# Patient Record
Sex: FEMALE | NOT HISPANIC OR LATINO | URBAN - METROPOLITAN AREA
[De-identification: names, ages, dates, MRNs, and addresses within clinical notes are randomized per-mention and may not be internally consistent; named-entity substitution may affect disease eponyms.]

---

## 2024-11-20 ENCOUNTER — HOSPITAL ENCOUNTER (OUTPATIENT)
Dept: RADIOLOGY | Facility: CLINIC | Age: 29
Discharge: HOME | End: 2024-11-20
Payer: COMMERCIAL

## 2024-11-20 DIAGNOSIS — Z34.90 ENCOUNTER FOR SUPERVISION OF NORMAL PREGNANCY, UNSPECIFIED, UNSPECIFIED TRIMESTER: ICD-10-CM

## 2024-11-20 PROCEDURE — 76819 FETAL BIOPHYS PROFIL W/O NST: CPT

## 2024-11-20 PROCEDURE — 76816 OB US FOLLOW-UP PER FETUS: CPT | Performed by: STUDENT IN AN ORGANIZED HEALTH CARE EDUCATION/TRAINING PROGRAM

## 2024-11-20 PROCEDURE — 76816 OB US FOLLOW-UP PER FETUS: CPT

## 2024-11-20 PROCEDURE — 76819 FETAL BIOPHYS PROFIL W/O NST: CPT | Performed by: STUDENT IN AN ORGANIZED HEALTH CARE EDUCATION/TRAINING PROGRAM

## 2025-04-12 LAB — NON-UH HIE HCG, QUANT: 143.2 MIU/ML

## 2025-04-14 LAB — NON-UH HIE HCG, QUANT: 485.8 MIU/ML

## 2025-05-07 PROBLEM — O34.219 HISTORY OF CESAREAN DELIVERY, CURRENTLY PREGNANT (HHS-HCC): Status: ACTIVE | Noted: 2025-05-07

## 2025-05-07 PROBLEM — Z3A.01 6 WEEKS GESTATION OF PREGNANCY (HHS-HCC): Status: ACTIVE | Noted: 2025-05-07

## 2025-05-07 PROBLEM — O30.011: Status: ACTIVE | Noted: 2025-05-07

## 2025-05-07 NOTE — ASSESSMENT & PLAN NOTE
- reviewed option for vaginal delivery with possible breech extraction in s/o mo/di twin pregnancy as above.   - Reviewed with patient the R/B/A of TOLAC vs RCS including   (1) Successful TOLAC BENEFITS (faster recovery time after birth of infant, avoidance of major abdominal surgery, lower risk of blood loss/infection/blood clots/future pregnancy complications, and decreased breathing difficulty at birth)    (2) Planned REPEAT CD BENEFITS (potential scheduled delivery date, eliminated risks of TOLAC)   (3) Unsuccessful TOLAC RISKS (tear in uterus in 0.5-0.9% women attempting TOLAC and requires an immediate CD and can lead to brain injury or death of the baby, increased risk of bleeding/infection/blood clots/hysterectomy  (4) Planned REPEAT CD BENEFITS (complication risks increase with # of prior CDs, increased risk of blood loss/infection/blood clots/injury to other organs/blood vessels)  - MU calculated chance of success was discussed with the patient.  Discussed the risks of repeat  were reviewed:  bleeding/infection/injury to surrounding organ structures/risk to the .

## 2025-05-07 NOTE — PROGRESS NOTES
Postpartum Progress Note    Assessment/Plan   Anjelica Manzano is a 29 y.o., No obstetric history on file., who delivered at Unknown gestation and is now postpartum day .    ***    Assessment & Plan  Monoamniotic and monochorionic twin gestation in first trimester ([facility])  - We discussed the implication of monochorionic-diamniotic twin gestation.  We went over expected weight gain with a twin gestation and discussed that we would recommend against routine bedrest.    - We discussed that, generally, multiple gestations are associated with an increased risk of most obstetric complications including of pre-term labor, PPROM, pre-eclampsia, gestational diabetes, growth restriction and  delivery.    - We discussed that the majority of twins (when all twins are considered more generally) deliver  with the average being 35 weeks gestation.    - We reviewed that low dose aspirin is recommended for prevention of preeclampsia.   - We reviewed the risks of aneuploidy and recommendations for genetic screening.  - Regarding delivery, we discussed our recommendation for delivery in the 36 weeks if other complications have not arisen due to the increased risk of stillbirth after that gestational age.  We reviewed the preferred route of delivery for twin gestation is a vaginal delivery though this is dependent on fetal presentation.  We reviewed that if Baby A was breech we would recommend a  section and if both fetuses are vertex we would recommend against a  section.  We discussed the option of breech extraction for Baby B if Baby A was vertex and Baby B was not and that in a large randomized study of attempted vaginal vs planned  delivery of twins between 32 and 39 weeks there was no significant decrease or increase in the risk of fetal or  death or serious  morbidity between the groups.  However, we did note that the option of breech extraction may depend on the comfort of  "both the patient and her provider at the time of delivery.  - In regard to monochorionic-diamniotic gestations we reviewed the risk of twin-twin transfusion syndrome in approximately 10% of pregnancies and need for close surveillance with biweekly assessment starting at 16 weeks. Given these heightened risks I did discuss that I would recommend management by maternal fetal medicine for prenatal care.  History of  delivery, currently pregnant ([facility])  - reviewed option for vaginal delivery with possible breech extraction in s/o mo/di twin pregnancy as above.   - Reviewed with patient the R/B/A of TOLAC vs RCS including   (1) Successful TOLAC BENEFITS (faster recovery time after birth of infant, avoidance of major abdominal surgery, lower risk of blood loss/infection/blood clots/future pregnancy complications, and decreased breathing difficulty at birth)    (2) Planned REPEAT CD BENEFITS (potential scheduled delivery date, eliminated risks of TOLAC)   (3) Unsuccessful TOLAC RISKS (tear in uterus in 0.5-0.9% women attempting TOLAC and requires an immediate CD and can lead to brain injury or death of the baby, increased risk of bleeding/infection/blood clots/hysterectomy  (4) Planned REPEAT CD BENEFITS (complication risks increase with # of prior CDs, increased risk of blood loss/infection/blood clots/injury to other organs/blood vessels)  - MFMU calculated chance of success was discussed with the patient.  Discussed the risks of repeat  were reviewed:  bleeding/infection/injury to surrounding organ structures/risk to the .  6 weeks gestation of pregnancy ([facility])  - PNLs ordered  - discussed genetics, recommend cfDNA in s/o mo/di twin pregnancy.     Problem List       No episode was linked to this visit.          Hospital course: {dx; complications postpartum ob:1947518500::\"no complications\"}       Subjective   Her pain is {well/moderately/poorly:03347} controlled with current " "medications  She {is/is not:79339} passing flatus  She {is/is not:45362} ambulating well  She {is/is not:53440} tolerating a No diet orders on file  She reports {complications; breast feedin::\"no breast or nursing problems\"}  She {postpartum moods:8280671205::\"denies emotional concerns\"} today   Her plan for contraception is {CONTRACEPTION:11010}     ***    Objective   Allergies:   Patient has no allergy information on record.         Last Vitals:  Temp Pulse Resp BP MAP Pulse Ox                   Vitals Min/Max Last 24 Hours:  @FLOWSTAT(6,8,9,5,726577:24::1)@    Intake/Output:   [unfilled]    Physical Exam:  {Postpartum Examination:1215743742}    Chaperone Present: {Desc; Yes,Declined,Comment:36117}  Chaperone Name/Title: ***  Examination Chaperoned: {Multiple Exams:58325}    Lab Data:  {Recent labs:3115758033}Antepartum Progress Note    Assessment/Plan   Anjelica Manzano is a 29 y.o. No obstetric history on file. at Unknown. ABDIRAHMAN: Not found..     ***    Assessment & Plan  Monoamniotic and monochorionic twin gestation in first trimester ([facility])    History of  delivery, currently pregnant ([facility])    6 weeks gestation of pregnancy ([facility])    Problem List       No episode was linked to this visit.            Subjective   ***    Objective   Allergies:   Patient has no allergy information on record.    Last Vitals:  Temp Pulse Resp BP MAP Pulse Ox                   Vitals Min/Max Last 24 Hours:  @FLOWSTAT(6,8,9,5,811663:24::1)@    Intake/Output:   [unfilled]    Physical Exam:  {Antepartum Examination:9157936801}    Chaperone Present: {Desc; Yes,Declined,Comment:38656}  Chaperone Name/Title: ***  Examination Chaperoned: {Multiple Exams:57851}    Lab Data:  {Recent labs:9980454640}    Subjective   Patient ID [id number]   Anjelica Manzano is a 29 y.o. No obstetric history on file. at Unknown with a working estimated date of delivery of Not found. who presents for an initial prenatal visit. This pregnancy " is {pregnancy; planned/unplanned:35901}.    Her pregnancy is complicated by:  ***    OB History   No obstetric history on file.          Objective   Physical Exam     Expected Total Weight Gain: Could not be calculated   Pregravid BMI: Could not be calculated             OBGyn Exam    Prenatal Labs  ***    Assessment/Plan   {Assess/Plan SmartLinks (Optional):85098}    Immunizations: ***  Prenatal Labs ordered  Daily prenatal vitamins prescribed  First trimester screening and second trimester screening discussed. Patient decided to ***  Follow up in 4 weeks for return OB visit.

## 2025-05-07 NOTE — ASSESSMENT & PLAN NOTE
- We discussed the implication of monochorionic-diamniotic twin gestation.  We went over expected weight gain with a twin gestation and discussed that we would recommend against routine bedrest.    - We discussed that, generally, multiple gestations are associated with an increased risk of most obstetric complications including of pre-term labor, PPROM, pre-eclampsia, gestational diabetes, growth restriction and  delivery.    - We discussed that the majority of twins (when all twins are considered more generally) deliver  with the average being 35 weeks gestation.    - We reviewed that low dose aspirin is recommended for prevention of preeclampsia.   - We reviewed the risks of aneuploidy and recommendations for genetic screening.  - Regarding delivery, we discussed our recommendation for delivery in the 36 weeks if other complications have not arisen due to the increased risk of stillbirth after that gestational age.  We reviewed the preferred route of delivery for twin gestation is a vaginal delivery though this is dependent on fetal presentation.  We reviewed that if Baby A was breech we would recommend a  section and if both fetuses are vertex we would recommend against a  section.  We discussed the option of breech extraction for Baby B if Baby A was vertex and Baby B was not and that in a large randomized study of attempted vaginal vs planned  delivery of twins between 32 and 39 weeks there was no significant decrease or increase in the risk of fetal or  death or serious  morbidity between the groups.  However, we did note that the option of breech extraction may depend on the comfort of both the patient and her provider at the time of delivery.  - In regard to monochorionic-diamniotic gestations we reviewed the risk of twin-twin transfusion syndrome in approximately 10% of pregnancies and need for close surveillance with biweekly assessment starting at 16  weeks. Given these heightened risks I did discuss that I would recommend management by maternal fetal medicine for prenatal care.

## 2025-05-07 NOTE — PROGRESS NOTES
5/7/2025   Anjelica Manzano     [facility] CONSULT NOTE  Referring Clinician: ***  Reason for consult: ***    HPI: Anjelica Manzano is a 29 y.o. No obstetric history on file. at Unknown here for consult for ***.     Patient reports ***.     {Other pregnancy complications include *** Problem List[1]}    {Denies contractions, bleeding, or LOF. Reports normal fetal movement}    10 point review of system is negative except as above    OB History    No obstetric history on file.           Past medical history: Denies*** HTN, DM, asthma, depression, or thyroid issues    Surgical History[2]    Medications: ***    RX Allergies[3]    Social History[4]    family history is not on file.      OBJECTIVE  There were no vitals taken for this visit.    Physical exam  ***  FHT: ***    ASSESSMENT & PLAN    Anjelica Manzano is a 29 y.o. No obstetric history on file. at Unknown here for the following:    Assessment & Plan  Antiphospholipid antibody syndrome (Multi)      Thank you for allowing us to participate in the care of your patient. {We anticipate she should be able to continue her care under your supervision. Please do not hesitate to contact us should any concerns arise}{Given her medical complexity we will transfer her care to our service}{We anticipate she should be able to continue her general care under your supervision and we will continue to follow her to manage her ***. Please do not hesitate to contact us with any questions}    Ellie Bobo MD   Maternal Fetal Medicine         [1] There is no problem list on file for this patient.  [2]   Past Surgical History:  Procedure Laterality Date    OTHER SURGICAL HISTORY  04/13/2021    Exploratory laparoscopy    OTHER SURGICAL HISTORY  04/13/2021    Jaw surgery    OTHER SURGICAL HISTORY  04/13/2021    Adenoidectomy    OTHER SURGICAL HISTORY  04/13/2021    Tonsillectomy   [3] Not on File  [4]

## 2025-05-08 ENCOUNTER — HOSPITAL ENCOUNTER (OUTPATIENT)
Dept: RADIOLOGY | Facility: CLINIC | Age: 30
Discharge: HOME | End: 2025-05-08
Payer: COMMERCIAL

## 2025-05-08 ENCOUNTER — INITIAL PRENATAL (OUTPATIENT)
Dept: MATERNAL FETAL MEDICINE | Facility: CLINIC | Age: 30
End: 2025-05-08
Payer: COMMERCIAL

## 2025-05-08 VITALS
HEART RATE: 81 BPM | WEIGHT: 207.5 LBS | BODY MASS INDEX: 34.8 KG/M2 | SYSTOLIC BLOOD PRESSURE: 133 MMHG | DIASTOLIC BLOOD PRESSURE: 83 MMHG

## 2025-05-08 DIAGNOSIS — O34.219 HISTORY OF CESAREAN DELIVERY, CURRENTLY PREGNANT (HHS-HCC): ICD-10-CM

## 2025-05-08 DIAGNOSIS — Z3A.01 6 WEEKS GESTATION OF PREGNANCY (HHS-HCC): ICD-10-CM

## 2025-05-08 DIAGNOSIS — O30.001 TWIN GESTATION WITH UNKNOWN NUMBER OF PLACENTAS AND AMNIOTIC SACS IN FIRST TRIMESTER (HHS-HCC): ICD-10-CM

## 2025-05-08 DIAGNOSIS — O30.031 MONOCHORIONIC DIAMNIOTIC TWIN GESTATION IN FIRST TRIMESTER (HHS-HCC): Primary | ICD-10-CM

## 2025-05-08 PROBLEM — O30.039 MONOCHORIONIC DIAMNIOTIC TWIN GESTATION (HHS-HCC): Status: ACTIVE | Noted: 2025-05-07

## 2025-05-08 PROCEDURE — 76801 OB US < 14 WKS SINGLE FETUS: CPT

## 2025-05-08 PROCEDURE — 99214 OFFICE O/P EST MOD 30 MIN: CPT | Mod: GC | Performed by: STUDENT IN AN ORGANIZED HEALTH CARE EDUCATION/TRAINING PROGRAM

## 2025-05-08 PROCEDURE — 76817 TRANSVAGINAL US OBSTETRIC: CPT

## 2025-05-08 PROCEDURE — 99204 OFFICE O/P NEW MOD 45 MIN: CPT | Performed by: STUDENT IN AN ORGANIZED HEALTH CARE EDUCATION/TRAINING PROGRAM

## 2025-05-08 ASSESSMENT — EDINBURGH POSTNATAL DEPRESSION SCALE (EPDS)
I HAVE BEEN SO UNHAPPY THAT I HAVE HAD DIFFICULTY SLEEPING: NOT AT ALL
I HAVE LOOKED FORWARD WITH ENJOYMENT TO THINGS: AS MUCH AS I EVER DID
I HAVE FELT SAD OR MISERABLE: NO, NOT AT ALL
I HAVE FELT SCARED OR PANICKY FOR NO GOOD REASON: NO, NOT AT ALL
I HAVE BEEN ABLE TO LAUGH AND SEE THE FUNNY SIDE OF THINGS: AS MUCH AS I ALWAYS COULD
I HAVE BEEN SO UNHAPPY THAT I HAVE BEEN CRYING: NO, NEVER
I HAVE BLAMED MYSELF UNNECESSARILY WHEN THINGS WENT WRONG: NOT VERY OFTEN
I HAVE BEEN ANXIOUS OR WORRIED FOR NO GOOD REASON: HARDLY EVER
THE THOUGHT OF HARMING MYSELF HAS OCCURRED TO ME: NEVER
THINGS HAVE BEEN GETTING ON TOP OF ME: NO, MOST OF THE TIME I HAVE COPED QUITE WELL
TOTAL SCORE: 3

## 2025-05-08 ASSESSMENT — ENCOUNTER SYMPTOMS
NEUROLOGICAL NEGATIVE: 0
GASTROINTESTINAL NEGATIVE: 0
EYES NEGATIVE: 0
MUSCULOSKELETAL NEGATIVE: 0
HEMATOLOGIC/LYMPHATIC NEGATIVE: 0
PSYCHIATRIC NEGATIVE: 0
ALLERGIC/IMMUNOLOGIC NEGATIVE: 0
CONSTITUTIONAL NEGATIVE: 0
CARDIOVASCULAR NEGATIVE: 0
RESPIRATORY NEGATIVE: 0
ENDOCRINE NEGATIVE: 0

## 2025-05-08 ASSESSMENT — PATIENT HEALTH QUESTIONNAIRE - PHQ9
SUM OF ALL RESPONSES TO PHQ9 QUESTIONS 1 AND 2: 0
1. LITTLE INTEREST OR PLEASURE IN DOING THINGS: NOT AT ALL
2. FEELING DOWN, DEPRESSED OR HOPELESS: NOT AT ALL

## 2025-05-08 ASSESSMENT — PAIN SCALES - GENERAL: PAINLEVEL_OUTOF10: 0 - NO PAIN

## 2025-05-08 ASSESSMENT — PAIN - FUNCTIONAL ASSESSMENT: PAIN_FUNCTIONAL_ASSESSMENT: 0-10

## 2025-05-08 NOTE — ASSESSMENT & PLAN NOTE
- reviewed option for vaginal delivery with possible breech extraction in s/o mo/di twin pregnancy as above.   - Reviewed with patient the R/B/A of TOLAC vs RCS including   (1) Successful TOLAC BENEFITS (faster recovery time after birth of infant, avoidance of major abdominal surgery, lower risk of blood loss/infection/blood clots/future pregnancy complications, and decreased breathing difficulty at birth)    (2) Planned REPEAT CD BENEFITS (potential scheduled delivery date, eliminated risks of TOLAC)   (3) Unsuccessful TOLAC RISKS (tear in uterus in 0.5-0.9% women attempting TOLAC and requires an immediate CD and can lead to brain injury or death of the baby, increased risk of bleeding/infection/blood clots/hysterectomy  (4) Planned REPEAT CD RISKS (complication risks increase with # of prior CDs, increased risk of blood loss/infection/blood clots/injury to other organs/blood vessels)

## 2025-05-08 NOTE — ASSESSMENT & PLAN NOTE
- discussed genetics, interested cfDNA in s/o mo/di twin pregnancy.   - ldASA as above   - pt planning to follow up with her OB at  [facility] at10 wks, then to transfer to [facility] care  - for [facility] follow up with NT US at 12 wks, interested in following at [facility]

## 2025-05-08 NOTE — PROGRESS NOTES
2025   Anjelica Manzano     [facility] CONSULT NOTE  Referring Clinician: [facility]  Reason for consult: mo/di twin pregnancy     HPI: Anjelica Manzano is a 29 y.o.  at Presenting for possible monochorionic monoamniotic gestation on OSH US.      Feeling some nausea, but overall feeling well. Having mild cramping occasionally. Pregnancy unplanned, desired. Feeling well supported by , immediate family.     Has started prenatal care with Dr. Bond at [facility]     Pregnancy c/b   - Mo/di twin pregnancy   - Hx CS - in s/o NRFHT , MFMU 55%  - hx sPEC in P1  - Class 1 obesity - BMI 35   - close interval pregnancy, last delivery 2024    OBHx:   2024 - sPEC during labor, CS for NRFHT  GynHx: denies hx abnl pap smears, STIs  PMH: none  PSH: CS x1, dx lap for endo (2019), tonsills, jaw surgery   Meds: PNV  All: NKDA  Soc: denies t/e/d     10 point review of system is negative except as above    OB History          2    Para   1    Term   1            AB        Living   1         SAB        IAB        Ectopic        Multiple        Live Births   1                   OBJECTIVE  Visit Vitals  /83   Pulse 81   Wt 94.1 kg (207 lb 8 oz)   LMP 2025   BMI 34.80 kg/m²   OB Status Pregnant   Smoking Status Never   BSA 2.07 m²       Physical exam  US    ASSESSMENT & PLAN    Anjelica Manzano is a 29 y.o.  at 8.0wga by 8wk US here for the following:    Assessment & Plan  Monochorionic diamniotic twin gestation in first trimester ([facility])  - Chorionicity confirmed on US today, mo/di. Twin A measuring 8w0d, Twin B lagging, measuring 7w3d.   - We discussed the implication of monochorionic-diamniotic twin gestation.  We went over expected weight gain with a twin gestation and discussed that we would recommend against routine bedrest.    - We discussed that, generally, multiple gestations are associated with an increased risk of most obstetric complications including of pre-term labor, PPROM, pre-eclampsia,  gestational diabetes, growth restriction and  delivery.    - We discussed that the majority of twins (when all twins are considered more generally) deliver  with the average being 35 weeks gestation.    - We reviewed that low dose aspirin is recommended for prevention of preeclampsia, pt has prescription and will start at 10wks.   - We reviewed the risks of aneuploidy and recommendations for genetic screening, pt interested in cfDNA.  - Regarding delivery, we discussed our recommendation for delivery in the 36 weeks if other complications have not arisen due to the increased risk of stillbirth after that gestational age.  We reviewed the preferred route of delivery for twin gestation is a vaginal delivery though this is dependent on fetal presentation.  We reviewed that if Baby A was breech we would recommend a  section and if both fetuses are vertex we would recommend against a  section.  We discussed the option of breech extraction for Baby B if Baby A was vertex and Baby B was not and that in a large randomized study of attempted vaginal vs planned  delivery of twins between 32 and 39 weeks there was no significant decrease or increase in the risk of fetal or  death or serious  morbidity between the groups.  However, we did note that the option of breech extraction may depend on the comfort of both the patient and her provider at the time of delivery.  - In regard to monochorionic-diamniotic gestations we reviewed the risk of twin-twin transfusion syndrome in approximately 10% of pregnancies and need for close surveillance with biweekly assessment starting at 16 weeks. Given these heightened risks I did discuss that I would recommend management by maternal fetal medicine for prenatal care.  History of  delivery, currently pregnant ([facility])  - reviewed option for vaginal delivery with possible breech extraction in s/o mo/di twin pregnancy as above.    - Reviewed with patient the R/B/A of TOLAC vs RCS including   (1) Successful TOLAC BENEFITS (faster recovery time after birth of infant, avoidance of major abdominal surgery, lower risk of blood loss/infection/blood clots/future pregnancy complications, and decreased breathing difficulty at birth)    (2) Planned REPEAT CD BENEFITS (potential scheduled delivery date, eliminated risks of TOLAC)   (3) Unsuccessful TOLAC RISKS (tear in uterus in 0.5-0.9% women attempting TOLAC and requires an immediate CD and can lead to brain injury or death of the baby, increased risk of bleeding/infection/blood clots/hysterectomy  (4) Planned REPEAT CD RISKS (complication risks increase with # of prior CDs, increased risk of blood loss/infection/blood clots/injury to other organs/blood vessels)  6 weeks gestation of pregnancy ([facility])  - discussed genetics, interested cfDNA in s/o mo/di twin pregnancy.   - ldASA as above   - pt planning to follow up with her OB at  [facility] at10 wks, then to transfer to [facility] care  - for [facility] follow up with NT US at 12 wks, interested in following at [place]     Thank you for allowing us to participate in the care of your patient. Given her medical complexity we will transfer her care to our service.  Please do not hesitate to contact us with any questions    Seen and d/w Dr. Jihan Rosa MD   Maternal Fetal Medicine

## 2025-05-08 NOTE — ASSESSMENT & PLAN NOTE
- Chorionicity confirmed on US today, mo/di. Twin A measuring 8w0d, Twin B lagging, measuring 7w3d.   - We discussed the implication of monochorionic-diamniotic twin gestation.  We went over expected weight gain with a twin gestation and discussed that we would recommend against routine bedrest.    - We discussed that, generally, multiple gestations are associated with an increased risk of most obstetric complications including of pre-term labor, PPROM, pre-eclampsia, gestational diabetes, growth restriction and  delivery.    - We discussed that the majority of twins (when all twins are considered more generally) deliver  with the average being 35 weeks gestation.    - We reviewed that low dose aspirin is recommended for prevention of preeclampsia, pt has prescription and will start at 10wks.   - We reviewed the risks of aneuploidy and recommendations for genetic screening, pt interested in cfDNA.  - Regarding delivery, we discussed our recommendation for delivery in the 36 weeks if other complications have not arisen due to the increased risk of stillbirth after that gestational age.  We reviewed the preferred route of delivery for twin gestation is a vaginal delivery though this is dependent on fetal presentation.  We reviewed that if Baby A was breech we would recommend a  section and if both fetuses are vertex we would recommend against a  section.  We discussed the option of breech extraction for Baby B if Baby A was vertex and Baby B was not and that in a large randomized study of attempted vaginal vs planned  delivery of twins between 32 and 39 weeks there was no significant decrease or increase in the risk of fetal or  death or serious  morbidity between the groups.  However, we did note that the option of breech extraction may depend on the comfort of both the patient and her provider at the time of delivery.  - In regard to monochorionic-diamniotic  gestations we reviewed the risk of twin-twin transfusion syndrome in approximately 10% of pregnancies and need for close surveillance with biweekly assessment starting at 16 weeks. Given these heightened risks I did discuss that I would recommend management by maternal fetal medicine for prenatal care.

## 2025-06-12 ENCOUNTER — TELEMEDICINE (OUTPATIENT)
Dept: PRIMARY CARE | Facility: CLINIC | Age: 30
End: 2025-06-12
Payer: COMMERCIAL

## 2025-06-12 DIAGNOSIS — T78.40XA ALLERGY, INITIAL ENCOUNTER: Primary | ICD-10-CM

## 2025-06-12 PROBLEM — O34.219 HISTORY OF CESAREAN DELIVERY, CURRENTLY PREGNANT (HHS-HCC): Status: RESOLVED | Noted: 2025-05-07 | Resolved: 2025-06-12

## 2025-06-12 PROBLEM — O30.039 MONOCHORIONIC DIAMNIOTIC TWIN GESTATION (HHS-HCC): Status: RESOLVED | Noted: 2025-05-07 | Resolved: 2025-06-12

## 2025-06-12 PROBLEM — Z3A.01 6 WEEKS GESTATION OF PREGNANCY (HHS-HCC): Status: RESOLVED | Noted: 2025-05-07 | Resolved: 2025-06-12

## 2025-06-12 PROCEDURE — 1036F TOBACCO NON-USER: CPT | Performed by: NURSE PRACTITIONER

## 2025-06-12 PROCEDURE — 99213 OFFICE O/P EST LOW 20 MIN: CPT | Performed by: NURSE PRACTITIONER

## 2025-06-12 RX ORDER — CETIRIZINE HYDROCHLORIDE 10 MG/1
10 TABLET ORAL DAILY
Qty: 30 TABLET | Refills: 1 | Status: SHIPPED | OUTPATIENT
Start: 2025-06-12 | End: 2025-09-10

## 2025-06-12 RX ORDER — OLOPATADINE HYDROCHLORIDE 1 MG/ML
1 SOLUTION OPHTHALMIC 2 TIMES DAILY PRN
Qty: 5 ML | Refills: 0 | Status: SHIPPED | OUTPATIENT
Start: 2025-06-12 | End: 2025-10-10

## 2025-06-12 NOTE — PROGRESS NOTES
Subjective   Patient ID: Anjelica Manzano is a 29 y.o. female who presents for Conjunctivitis.    Virtual or Telephone Consent    An interactive audio and video telecommunication system which permits real time communications between the patient (at the originating site) and provider (at the distant site) was utilized to provide this telehealth service.   Verbal consent was requested and obtained from Anjelica Manzano on this date, 06/12/25 for a telehealth visit and the patient's location was confirmed at the time of the visit.  Patient is located in [place]  DISCLAIMER:   In preparing for this visit and writing this note, I reviewed previous electronic medical records (labs, imaging and medical charts) of the patient available in the physician portal. Significant findings which helped in decision making are recorded in this encounter charting.  All allergies were reviewed with the patient and all medications reconciled with   the patient.    On June 8th had watery eyes.  The next morning noticed that her eyes were swollen.  Conjunctiva is not red.  Eyes are itchy as well.  Used eye allergy eye drops and not helping   Denies fever, vision changes, cough, congestion        Conjunctivitis          Review of Systems   All other systems reviewed and are negative.      Objective   LMP 03/13/2025   Breastfeeding No     Physical Exam  Constitutional:       General: She is not in acute distress.     Appearance: Normal appearance.   HENT:      Head: Normocephalic and atraumatic.      Right Ear: External ear normal.      Left Ear: External ear normal.      Nose: Nose normal.      Mouth/Throat:      Mouth: Mucous membranes are moist.   Eyes:      General:         Right eye: No discharge.         Left eye: No discharge.      Extraocular Movements: Extraocular movements intact.      Conjunctiva/sclera: Conjunctivae normal.      Pupils: Pupils are equal, round, and reactive to light.      Comments: Slight swelling to lower lids      Pulmonary:      Effort: Pulmonary effort is normal.   Neurological:      Mental Status: She is alert and oriented to person, place, and time.   Psychiatric:         Mood and Affect: Mood normal.         Behavior: Behavior normal.         Thought Content: Thought content normal.         Judgment: Judgment normal.         Assessment/Plan   Problem List Items Addressed This Visit           ICD-10-CM    Allergies - Primary T78.40XA    Start cetirizine once a day  Patanol eye drops BID  If no improvement follow up with pcp in 3-5 days

## 2025-06-12 NOTE — ASSESSMENT & PLAN NOTE
Start cetirizine once a day  Patanol eye drops BID  If no improvement follow up with pcp in 3-5 days

## 2025-06-19 ENCOUNTER — APPOINTMENT (OUTPATIENT)
Dept: RADIOLOGY | Facility: CLINIC | Age: 30
End: 2025-06-19
Payer: COMMERCIAL

## 2025-07-28 LAB — NON-UH HIE HCG, QUANT: 32.9 MIU/ML

## 2025-07-30 LAB — NON-UH HIE HCG, QUANT: 50.6 MIU/ML

## 2025-08-05 LAB — NON-UH HIE HCG, QUANT: 403.6 MIU/ML

## 2025-08-11 LAB — NON-UH HIE HCG, QUANT: 2661.8 MIU/ML
